# Patient Record
Sex: MALE | Race: WHITE | NOT HISPANIC OR LATINO | URBAN - METROPOLITAN AREA
[De-identification: names, ages, dates, MRNs, and addresses within clinical notes are randomized per-mention and may not be internally consistent; named-entity substitution may affect disease eponyms.]

---

## 2022-11-09 ENCOUNTER — OFFICE VISIT (OUTPATIENT)
Dept: URBAN - METROPOLITAN AREA CLINIC 63 | Facility: CLINIC | Age: 62
End: 2022-11-09
Payer: MEDICARE

## 2022-11-09 ENCOUNTER — WEB ENCOUNTER (OUTPATIENT)
Dept: URBAN - METROPOLITAN AREA CLINIC 63 | Facility: CLINIC | Age: 62
End: 2022-11-09

## 2022-11-09 VITALS
BODY MASS INDEX: 28.41 KG/M2 | DIASTOLIC BLOOD PRESSURE: 72 MMHG | TEMPERATURE: 97.5 F | WEIGHT: 191.8 LBS | OXYGEN SATURATION: 99 % | SYSTOLIC BLOOD PRESSURE: 106 MMHG | HEART RATE: 69 BPM | HEIGHT: 69 IN

## 2022-11-09 DIAGNOSIS — R63.4 ABNORMAL WEIGHT LOSS: ICD-10-CM

## 2022-11-09 DIAGNOSIS — R11.0 NAUSEA: ICD-10-CM

## 2022-11-09 DIAGNOSIS — K59.00 CONSTIPATION, UNSPECIFIED CONSTIPATION TYPE: ICD-10-CM

## 2022-11-09 DIAGNOSIS — K21.9 GASTROESOPHAGEAL REFLUX DISEASE WITHOUT ESOPHAGITIS: ICD-10-CM

## 2022-11-09 PROCEDURE — 99204 OFFICE O/P NEW MOD 45 MIN: CPT | Performed by: PHYSICIAN ASSISTANT

## 2022-11-09 RX ORDER — TAMSULOSIN HYDROCHLORIDE 0.4 MG/1
1 CAPSULE CAPSULE ORAL ONCE A DAY
Status: ACTIVE | COMMUNITY

## 2022-11-09 RX ORDER — ATORVASTATIN CALCIUM 40 MG/1
1 TABLET TABLET, FILM COATED ORAL ONCE A DAY
Status: ACTIVE | COMMUNITY

## 2022-11-09 RX ORDER — MULTIVIT WITH MINERALS/LUTEIN
AS DIRECTED TABLET ORAL
Status: ACTIVE | COMMUNITY

## 2022-11-09 RX ORDER — LISINOPRIL 20 MG/1
1 TABLET TABLET ORAL ONCE A DAY
Status: ACTIVE | COMMUNITY

## 2022-11-09 RX ORDER — SOLIFENACIN SUCCINATE 10 MG/1
1 TABLET TABLET, FILM COATED ORAL ONCE A DAY
Status: ACTIVE | COMMUNITY

## 2022-11-09 RX ORDER — ESOMEPRAZOLE MAGNESIUM 20 MG/1
1 CAPSULE 30 MINS BEFORE MEALS CAPSULE, DELAYED RELEASE ORAL TWICE A DAY
Refills: 2 | OUTPATIENT
Start: 2022-11-09

## 2022-11-09 RX ORDER — MOMETASONE FUROATE 50 UG/1
4 SPRAYS (2 SPRAYS IN EACH NOSTRIL) SPRAY, METERED NASAL ONCE A DAY
Status: ACTIVE | COMMUNITY

## 2022-11-09 RX ORDER — ACETAMINOPHEN 650 MG
2 TABLETS AS NEEDED TABLET, EXTENDED RELEASE ORAL
Status: ACTIVE | COMMUNITY

## 2022-11-09 RX ORDER — NAPROXEN 500 MG/1
1 TABLET WITH FOOD OR MILK AS NEEDED TABLET ORAL TWICE A DAY
Status: ACTIVE | COMMUNITY

## 2022-11-09 RX ORDER — ESOMEPRAZOLE MAGNESIUM 20 MG/1
1 CAPSULE CAPSULE, DELAYED RELEASE ORAL ONCE A DAY
Status: ACTIVE | COMMUNITY

## 2022-11-09 RX ORDER — KRILL/OM-3/DHA/EPA/PHOSPHO/AST 1000-230MG
1 TABLET CAPSULE ORAL ONCE A DAY
Status: ACTIVE | COMMUNITY

## 2022-11-09 NOTE — PHYSICAL EXAM GASTROINTESTINAL
Abdomen , soft, nontender, nondistended , no guarding or rigidity , no masses palpable , normal bowel sounds , Liver and Spleen,  no hepatosplenomegaly , liver nontender unknown

## 2022-11-09 NOTE — HPI-TODAY'S VISIT:
Joel Gentile) is a pleasant 62-year-old male who presents today for evaluation of abnormal weight loss  KUB dated 11/7/2022 showed left internal double-J ureteral stent which appears appropriately positioned.  Bilateral urinary tract calculi  He states he had COVID a year ago and lost his sense of smell and taste. History of CVA in 7/2022. Lost 39 pounds since July 2022 and 29 of those pounds over the last 6 weeks. Mentions when he eats food it tastes terrible and when he eats or drinks something it smells "rancid". Frequent nausea with prandial events. Patient's last colonoscopy was approx 1 year ago. No record available for review. Per patient it was normal and placed into a 10 year recall. Chronic reflux well controlled on Nexium 20 mg qd. Chronic PPI use over many years.  Notes a bowel movement every 4-5 days. Denies vomiting, dysphagia, odynophagia, hematemesis, coffee ground emesis, abdominal pain, BRBPR or melena. Denies family history of colon cancer or colon polyps. No other GI complaints at this time

## 2022-11-12 ENCOUNTER — LAB OUTSIDE AN ENCOUNTER (OUTPATIENT)
Dept: URBAN - METROPOLITAN AREA CLINIC 63 | Facility: CLINIC | Age: 62
End: 2022-11-12

## 2022-11-12 ENCOUNTER — TELEPHONE ENCOUNTER (OUTPATIENT)
Dept: URBAN - METROPOLITAN AREA CLINIC 63 | Facility: CLINIC | Age: 62
End: 2022-11-12

## 2022-11-12 LAB
A/G RATIO: 2
AFP, SERUM, TUMOR MARKER: 1.8
ALBUMIN: 4.3
ALKALINE PHOSPHATASE: 68
ALT (SGPT): 23
AST (SGOT): 19
BILIRUBIN, TOTAL: 1.1
BUN/CREATININE RATIO: (no result)
BUN: 22
C-REACTIVE PROTEIN, QUANT: 0.8
CA 19-9: 6
CALCIUM: 9.5
CARBON DIOXIDE, TOTAL: 26
CEA: 0.8
CHLORIDE: 107
CREATININE: 1.22
EGFR: 66
GLOBULIN, TOTAL: 2.2
GLUCOSE: 81
HEMATOCRIT: 40.7
HEMOGLOBIN: 13.8
HEPATITIS C ANTIBODY: (no result)
MCH: 31.1
MCHC: 33.9
MCV: 91.7
MPV: 9.3
PLATELET COUNT: 177
POTASSIUM: 4.9
PROTEIN, TOTAL: 6.5
RDW: 13.2
RED BLOOD CELL COUNT: 4.44
SIGNAL TO CUT-OFF: 0.02
SODIUM: 142
TSH: 2.49
WHITE BLOOD CELL COUNT: 5.8

## 2022-11-16 ENCOUNTER — LAB OUTSIDE AN ENCOUNTER (OUTPATIENT)
Dept: URBAN - METROPOLITAN AREA CLINIC 63 | Facility: CLINIC | Age: 62
End: 2022-11-16

## 2022-11-18 ENCOUNTER — OFFICE VISIT (OUTPATIENT)
Dept: URBAN - METROPOLITAN AREA SURGERY CENTER 4 | Facility: SURGERY CENTER | Age: 62
End: 2022-11-18
Payer: MEDICARE

## 2022-11-18 ENCOUNTER — CLAIMS CREATED FROM THE CLAIM WINDOW (OUTPATIENT)
Dept: URBAN - METROPOLITAN AREA CLINIC 4 | Facility: CLINIC | Age: 62
End: 2022-11-18
Payer: MEDICARE

## 2022-11-18 DIAGNOSIS — R11.0 NAUSEA: ICD-10-CM

## 2022-11-18 DIAGNOSIS — K31.89 OTHER DISEASES OF STOMACH AND DUODENUM: ICD-10-CM

## 2022-11-18 DIAGNOSIS — K29.70 GASTRITIS: ICD-10-CM

## 2022-11-18 DIAGNOSIS — R63.4 ABNORMAL WEIGHT LOSS: ICD-10-CM

## 2022-11-18 DIAGNOSIS — K29.70 GASTRITIS, UNSPECIFIED, WITHOUT BLEEDING: ICD-10-CM

## 2022-11-18 PROCEDURE — 43239 EGD BIOPSY SINGLE/MULTIPLE: CPT | Performed by: INTERNAL MEDICINE

## 2022-11-18 PROCEDURE — 88312 SPECIAL STAINS GROUP 1: CPT | Performed by: PATHOLOGY

## 2022-11-18 PROCEDURE — 88305 TISSUE EXAM BY PATHOLOGIST: CPT | Performed by: PATHOLOGY

## 2022-11-18 RX ORDER — MULTIVIT WITH MINERALS/LUTEIN
AS DIRECTED TABLET ORAL
Status: ACTIVE | COMMUNITY

## 2022-11-18 RX ORDER — LISINOPRIL 20 MG/1
1 TABLET TABLET ORAL ONCE A DAY
Status: ACTIVE | COMMUNITY

## 2022-11-18 RX ORDER — MOMETASONE FUROATE 50 UG/1
4 SPRAYS (2 SPRAYS IN EACH NOSTRIL) SPRAY, METERED NASAL ONCE A DAY
Status: ACTIVE | COMMUNITY

## 2022-11-18 RX ORDER — NAPROXEN 500 MG/1
1 TABLET WITH FOOD OR MILK AS NEEDED TABLET ORAL TWICE A DAY
Status: ACTIVE | COMMUNITY

## 2022-11-18 RX ORDER — TAMSULOSIN HYDROCHLORIDE 0.4 MG/1
1 CAPSULE CAPSULE ORAL ONCE A DAY
Status: ACTIVE | COMMUNITY

## 2022-11-18 RX ORDER — SOLIFENACIN SUCCINATE 10 MG/1
1 TABLET TABLET, FILM COATED ORAL ONCE A DAY
Status: ACTIVE | COMMUNITY

## 2022-11-18 RX ORDER — ESOMEPRAZOLE MAGNESIUM 20 MG/1
1 CAPSULE CAPSULE, DELAYED RELEASE ORAL ONCE A DAY
Status: ACTIVE | COMMUNITY

## 2022-11-18 RX ORDER — ATORVASTATIN CALCIUM 40 MG/1
1 TABLET TABLET, FILM COATED ORAL ONCE A DAY
Status: ACTIVE | COMMUNITY

## 2022-11-18 RX ORDER — KRILL/OM-3/DHA/EPA/PHOSPHO/AST 1000-230MG
1 TABLET CAPSULE ORAL ONCE A DAY
Status: ACTIVE | COMMUNITY

## 2022-11-18 RX ORDER — ACETAMINOPHEN 650 MG
2 TABLETS AS NEEDED TABLET, EXTENDED RELEASE ORAL
Status: ACTIVE | COMMUNITY

## 2022-11-18 RX ORDER — ESOMEPRAZOLE MAGNESIUM 20 MG/1
1 CAPSULE 30 MINS BEFORE MEALS CAPSULE, DELAYED RELEASE ORAL TWICE A DAY
Refills: 2 | Status: ACTIVE | COMMUNITY
Start: 2022-11-09

## 2022-12-07 ENCOUNTER — LAB OUTSIDE AN ENCOUNTER (OUTPATIENT)
Dept: URBAN - METROPOLITAN AREA CLINIC 63 | Facility: CLINIC | Age: 62
End: 2022-12-07

## 2022-12-07 ENCOUNTER — OFFICE VISIT (OUTPATIENT)
Dept: URBAN - METROPOLITAN AREA CLINIC 63 | Facility: CLINIC | Age: 62
End: 2022-12-07
Payer: MEDICARE

## 2022-12-07 VITALS
TEMPERATURE: 97.6 F | OXYGEN SATURATION: 98 % | HEART RATE: 68 BPM | HEIGHT: 69 IN | DIASTOLIC BLOOD PRESSURE: 72 MMHG | BODY MASS INDEX: 25.98 KG/M2 | WEIGHT: 175.4 LBS | SYSTOLIC BLOOD PRESSURE: 114 MMHG

## 2022-12-07 DIAGNOSIS — R11.0 NAUSEA: ICD-10-CM

## 2022-12-07 DIAGNOSIS — R63.4 ABNORMAL WEIGHT LOSS: ICD-10-CM

## 2022-12-07 DIAGNOSIS — R43.9 TASTE DISORDER: ICD-10-CM

## 2022-12-07 DIAGNOSIS — R93.89 ABNORMAL CT OF THE CHEST: ICD-10-CM

## 2022-12-07 DIAGNOSIS — K59.00 CONSTIPATION, UNSPECIFIED CONSTIPATION TYPE: ICD-10-CM

## 2022-12-07 DIAGNOSIS — K21.9 GASTROESOPHAGEAL REFLUX DISEASE WITHOUT ESOPHAGITIS: ICD-10-CM

## 2022-12-07 DIAGNOSIS — K86.89 PANCREATIC ATROPHY: ICD-10-CM

## 2022-12-07 PROBLEM — 14760008: Status: ACTIVE | Noted: 2022-11-09

## 2022-12-07 PROBLEM — 266435005: Status: ACTIVE | Noted: 2022-11-09

## 2022-12-07 PROCEDURE — 99214 OFFICE O/P EST MOD 30 MIN: CPT | Performed by: PHYSICIAN ASSISTANT

## 2022-12-07 RX ORDER — ACETAMINOPHEN 650 MG
2 TABLETS AS NEEDED TABLET, EXTENDED RELEASE ORAL
Status: ACTIVE | COMMUNITY

## 2022-12-07 RX ORDER — MULTIVIT WITH MINERALS/LUTEIN
AS DIRECTED TABLET ORAL
Status: ON HOLD | COMMUNITY

## 2022-12-07 RX ORDER — LISINOPRIL 20 MG/1
1 TABLET TABLET ORAL ONCE A DAY
Status: ON HOLD | COMMUNITY

## 2022-12-07 RX ORDER — ESOMEPRAZOLE MAGNESIUM 20 MG/1
1 CAPSULE 30 MINS BEFORE MEALS CAPSULE, DELAYED RELEASE ORAL TWICE A DAY
Refills: 2 | Status: ON HOLD | COMMUNITY
Start: 2022-11-09

## 2022-12-07 RX ORDER — TAMSULOSIN HYDROCHLORIDE 0.4 MG/1
1 CAPSULE CAPSULE ORAL ONCE A DAY
Status: ON HOLD | COMMUNITY

## 2022-12-07 RX ORDER — NAPROXEN 500 MG/1
1 TABLET WITH FOOD OR MILK AS NEEDED TABLET ORAL TWICE A DAY
Status: ACTIVE | COMMUNITY

## 2022-12-07 RX ORDER — VIBEGRON 75 MG/1
1 TABLET TABLET, FILM COATED ORAL ONCE A DAY
Status: ACTIVE | COMMUNITY

## 2022-12-07 RX ORDER — ATORVASTATIN CALCIUM 40 MG/1
1 TABLET TABLET, FILM COATED ORAL ONCE A DAY
Status: ACTIVE | COMMUNITY

## 2022-12-07 RX ORDER — ESOMEPRAZOLE MAGNESIUM 20 MG/1
1 CAPSULE CAPSULE, DELAYED RELEASE ORAL ONCE A DAY
Status: ACTIVE | COMMUNITY

## 2022-12-07 RX ORDER — KRILL/OM-3/DHA/EPA/PHOSPHO/AST 1000-230MG
1 TABLET CAPSULE ORAL ONCE A DAY
Status: ACTIVE | COMMUNITY

## 2022-12-07 RX ORDER — SOLIFENACIN SUCCINATE 10 MG/1
1 TABLET TABLET, FILM COATED ORAL ONCE A DAY
Status: ON HOLD | COMMUNITY

## 2022-12-07 RX ORDER — MOMETASONE FUROATE 50 UG/1
4 SPRAYS (2 SPRAYS IN EACH NOSTRIL) SPRAY, METERED NASAL ONCE A DAY
Status: ON HOLD | COMMUNITY

## 2022-12-07 NOTE — HPI-TODAY'S VISIT:
Joel Gentile) is a pleasant 62-year-old male who presents today for a procedure follow up. Seen last visit in evaluation of abnormal weight loss  Colonoscopy performed in March 2022 demonstrated moderate sigmoid diverticulosis.  Repeat colonoscopy in 10 years  EGD dated 11/18/2022 showed a regular Z-line.  Gastritis.  Normal duodenum.  Multiple gastric polyps in the gastric body biopsied.  Labs dated 11/9/2022 demonstrated normal CEA.  AFP normal.  CA 19-9 normal.  CRP normal.  CMP normal.  CBC normal.  TSH normal.  HCV antibody negative.  Labs dated 11/11/2022 showed a normal hemoglobin.  Normal platelets.  PT/INR normal.  TSH normal.  GFR normal.  Cr normal. Total bilirubin 1.4 but otherwise normal liver enzymes  CTA chest dated 11/11/2022 showed no findings of PE.  Focal areas of mucous plugging within the posterior basilar segment of the right lower lobe.  Underlying endobronchial lesion cannot be entirely excluded.  Consider follow-up imaging in 1-3 months to assess for stability or interval change  CT chest with contrast dated 12/2/2022 showed no acute or suspicious finding in the chest.  Resolution of the focal mucous plugging in the right lower lobe reported on prior CTA 11/11/2022.  CT abdomen/pelvis with contrast dated 12/2/2022 showed no cause for weight loss identified.  Small bilateral nonobstructing renal calculi.  Left ureteral stent.  Small right renal cyst and few subcentimeter hypodensities in the left kidney that are too small to definitively characterize but cysts are possible.  Mild prostatomegaly.  Small amount of gas within the bladder is potentially iatrogenic.  Recommend correlation with recent medical/procedural history to help determine etiology.  No tethering to adjacent structures to suggest a fistula. Patient with Cystourethroscopy 2 days prior.  No issues after procedure. Pathology not back yet from his endoscopy. Last visit patient mentioned he had COVID about a year ago and since then lost his smell and taste.  History of CVA in July 2022 and since then lost about 55 pounds. 16 pound weight loss since last visit. He noted when he eats having a terrible taste in his mouth and rancid smell.  Noted frequent nausea with prandial events.  Noted issues with constipation having a bowel movement every 4 to 5 days. Chronic reflux well controlled on Nexium 20 mg BID.  At today's visit notes worsening nausea and vomiting. Worse when eats. Bowel movements now regular. Denies dysphagia, odynophagia, hematemesis, coffee ground emesis, abdominal pain, BRBPR or melena. Denies family history of colon cancer or colon polyps. No other GI complaints at this time

## 2022-12-09 ENCOUNTER — TELEPHONE ENCOUNTER (OUTPATIENT)
Dept: URBAN - METROPOLITAN AREA CLINIC 63 | Facility: CLINIC | Age: 62
End: 2022-12-09

## 2022-12-12 ENCOUNTER — OFFICE VISIT (OUTPATIENT)
Dept: URBAN - METROPOLITAN AREA MEDICAL CENTER 3 | Facility: MEDICAL CENTER | Age: 62
End: 2022-12-12
Payer: MEDICARE

## 2022-12-12 DIAGNOSIS — R63.4 ABNORMAL WEIGHT LOSS: ICD-10-CM

## 2022-12-12 DIAGNOSIS — K29.70 GASTRITIS: ICD-10-CM

## 2022-12-12 DIAGNOSIS — K31.7 GASTRIC POLYPS: ICD-10-CM

## 2022-12-12 DIAGNOSIS — K86.89 PANCREATIC ATROPHY: ICD-10-CM

## 2022-12-12 PROCEDURE — 43259 EGD US EXAM DUODENUM/JEJUNUM: CPT | Performed by: INTERNAL MEDICINE

## 2022-12-12 RX ORDER — NAPROXEN 500 MG/1
1 TABLET WITH FOOD OR MILK AS NEEDED TABLET ORAL TWICE A DAY
Status: ACTIVE | COMMUNITY

## 2022-12-12 RX ORDER — MULTIVIT WITH MINERALS/LUTEIN
AS DIRECTED TABLET ORAL
Status: ON HOLD | COMMUNITY

## 2022-12-12 RX ORDER — ATORVASTATIN CALCIUM 40 MG/1
1 TABLET TABLET, FILM COATED ORAL ONCE A DAY
Status: ACTIVE | COMMUNITY

## 2022-12-12 RX ORDER — ACETAMINOPHEN 650 MG
2 TABLETS AS NEEDED TABLET, EXTENDED RELEASE ORAL
Status: ACTIVE | COMMUNITY

## 2022-12-12 RX ORDER — SOLIFENACIN SUCCINATE 10 MG/1
1 TABLET TABLET, FILM COATED ORAL ONCE A DAY
Status: ON HOLD | COMMUNITY

## 2022-12-12 RX ORDER — VIBEGRON 75 MG/1
1 TABLET TABLET, FILM COATED ORAL ONCE A DAY
Status: ACTIVE | COMMUNITY

## 2022-12-12 RX ORDER — LISINOPRIL 20 MG/1
1 TABLET TABLET ORAL ONCE A DAY
Status: ON HOLD | COMMUNITY

## 2022-12-12 RX ORDER — ESOMEPRAZOLE MAGNESIUM 20 MG/1
1 CAPSULE 30 MINS BEFORE MEALS CAPSULE, DELAYED RELEASE ORAL TWICE A DAY
Refills: 2 | Status: ON HOLD | COMMUNITY
Start: 2022-11-09

## 2022-12-12 RX ORDER — ESOMEPRAZOLE MAGNESIUM 20 MG/1
1 CAPSULE CAPSULE, DELAYED RELEASE ORAL ONCE A DAY
Status: ACTIVE | COMMUNITY

## 2022-12-12 RX ORDER — MOMETASONE FUROATE 50 UG/1
4 SPRAYS (2 SPRAYS IN EACH NOSTRIL) SPRAY, METERED NASAL ONCE A DAY
Status: ON HOLD | COMMUNITY

## 2022-12-12 RX ORDER — TAMSULOSIN HYDROCHLORIDE 0.4 MG/1
1 CAPSULE CAPSULE ORAL ONCE A DAY
Status: ON HOLD | COMMUNITY

## 2022-12-12 RX ORDER — KRILL/OM-3/DHA/EPA/PHOSPHO/AST 1000-230MG
1 TABLET CAPSULE ORAL ONCE A DAY
Status: ACTIVE | COMMUNITY

## 2022-12-20 ENCOUNTER — TELEPHONE ENCOUNTER (OUTPATIENT)
Dept: URBAN - METROPOLITAN AREA CLINIC 63 | Facility: CLINIC | Age: 62
End: 2022-12-20

## 2022-12-30 ENCOUNTER — OFFICE VISIT (OUTPATIENT)
Dept: URBAN - METROPOLITAN AREA CLINIC 63 | Facility: CLINIC | Age: 62
End: 2022-12-30

## 2022-12-30 ENCOUNTER — TELEPHONE ENCOUNTER (OUTPATIENT)
Dept: URBAN - METROPOLITAN AREA CLINIC 63 | Facility: CLINIC | Age: 62
End: 2022-12-30

## 2022-12-30 PROBLEM — 4556007 GASTRITIS: Status: ACTIVE | Noted: 2022-12-10

## 2022-12-30 PROBLEM — 92411005 BENIGN NEOPLASM OF STOMACH: Status: ACTIVE | Noted: 2022-12-30

## 2023-01-12 ENCOUNTER — OFFICE VISIT (OUTPATIENT)
Dept: URBAN - METROPOLITAN AREA CLINIC 63 | Facility: CLINIC | Age: 63
End: 2023-01-12
Payer: MEDICARE

## 2023-01-12 ENCOUNTER — LAB OUTSIDE AN ENCOUNTER (OUTPATIENT)
Dept: URBAN - METROPOLITAN AREA CLINIC 63 | Facility: CLINIC | Age: 63
End: 2023-01-12

## 2023-01-12 VITALS
SYSTOLIC BLOOD PRESSURE: 124 MMHG | DIASTOLIC BLOOD PRESSURE: 78 MMHG | BODY MASS INDEX: 26.33 KG/M2 | WEIGHT: 177.8 LBS | HEIGHT: 69 IN | TEMPERATURE: 97.9 F | OXYGEN SATURATION: 99 % | HEART RATE: 75 BPM

## 2023-01-12 DIAGNOSIS — R93.89 ABNORMAL CT OF THE CHEST: ICD-10-CM

## 2023-01-12 DIAGNOSIS — R43.9 TASTE DISORDER: ICD-10-CM

## 2023-01-12 DIAGNOSIS — R63.4 ABNORMAL WEIGHT LOSS: ICD-10-CM

## 2023-01-12 PROBLEM — 16900311000119102: Status: ACTIVE | Noted: 2022-12-07

## 2023-01-12 PROBLEM — 399993004: Status: ACTIVE | Noted: 2022-12-07

## 2023-01-12 PROCEDURE — 99213 OFFICE O/P EST LOW 20 MIN: CPT | Performed by: INTERNAL MEDICINE

## 2023-01-12 RX ORDER — ATORVASTATIN CALCIUM 40 MG/1
1 TABLET TABLET, FILM COATED ORAL ONCE A DAY
Status: ACTIVE | COMMUNITY

## 2023-01-12 RX ORDER — ACETAMINOPHEN 650 MG
2 TABLETS AS NEEDED TABLET, EXTENDED RELEASE ORAL
Status: ACTIVE | COMMUNITY

## 2023-01-12 RX ORDER — ESOMEPRAZOLE MAGNESIUM 20 MG/1
1 CAPSULE CAPSULE, DELAYED RELEASE ORAL ONCE A DAY
Status: ACTIVE | COMMUNITY

## 2023-01-12 RX ORDER — NAPROXEN 500 MG/1
1 TABLET WITH FOOD OR MILK AS NEEDED TABLET ORAL TWICE A DAY
Status: ACTIVE | COMMUNITY

## 2023-01-12 NOTE — HPI-TODAY'S VISIT:
Joel Gentile) is a pleasant 62-year-old male who presents today for a procedure follow up. Right upper quadrant ultrasound ordered last visit not completed.  Consideration of PET scan pending clinical course.  Recommended he follow-up with urology, neurology and pulmonology as needed including repeat imaging as needed.  Patient was referred to another pulmonologist for second opinion.  Recommended he continue 20 mg of Nexium twice daily.  Referred to ENT for evaluation of dysguesia.  Approximately 55 pound weight loss since his CVA in July 2022. Last visit noted worsening nausea and vomiting.  GES dated 1/3/23 showed rapid emptying  Underwent EUS/EGD on 12/12/2022 showed no pancreatic mass lesion seen.  Pancreatic atrophy.  Gastric polyps.  Gastritis.  Duodenal diverticulum in D2.  Colonoscopy performed in March 2022 demonstrated moderate sigmoid diverticulosis.  Repeat colonoscopy in 10 years  EGD dated 11/18/2022 showed a regular Z-line.  Gastritis.  Normal duodenum.  Multiple gastric polyps in the gastric body biopsied. No significant duodenal abnormality.  Chemical/reactive gastropathy negative for H. pylori or intestinal metaplasia.  Body polyp consistent with a hyperplastic polyp.  GE junction mucosa benign.  Labs dated 11/9/2022 demonstrated normal CEA.  AFP normal.  CA 19-9 normal.  CRP normal.  CMP normal.  CBC normal.  TSH normal.  HCV antibody negative.  Labs dated 11/11/2022 showed a normal hemoglobin.  Normal platelets.  PT/INR normal.  TSH normal.  GFR normal.  Cr normal. Total bilirubin 1.4 but otherwise normal liver enzymes  CTA chest dated 11/11/2022 showed no findings of PE.  Focal areas of mucous plugging within the posterior basilar segment of the right lower lobe.  Underlying endobronchial lesion cannot be entirely excluded.  Consider follow-up imaging in 1-3 months to assess for stability or interval change  CT chest with contrast dated 12/2/2022 showed no acute or suspicious finding in the chest.  Resolution of the focal mucous plugging in the right lower lobe reported on prior CTA 11/11/2022.  CT abdomen/pelvis with contrast dated 12/2/2022 showed no cause for weight loss identified.  Small bilateral nonobstructing renal calculi.  Left ureteral stent.  Small right renal cyst and few subcentimeter hypodensities in the left kidney that are too small to definitively characterize but cysts are possible.  Mild prostatomegaly.  Small amount of gas within the bladder is potentially iatrogenic.  Recommend correlation with recent medical/procedural history to help determine etiology.  No tethering to adjacent structures to suggest a fistula. Patient with Cystourethroscopy 2 days prior.    Patient still with  dysguesia, though mildly improved. Also with loss of smell. Hx of CVA in july 2022.   Denies hearburn,constipation ,rectal bleeding.Has gained 2 lbs since last visit. Lost 50lbs in past 6-7mo. Has not seen Pulmonology Feels nexium BID of little benefit. DEnies nausea, emesis Has seen DR GARCIA neurology recently no records available Overall states feels better more energy

## 2023-02-10 ENCOUNTER — TELEPHONE ENCOUNTER (OUTPATIENT)
Dept: URBAN - METROPOLITAN AREA CLINIC 63 | Facility: CLINIC | Age: 63
End: 2023-02-10

## 2023-03-17 ENCOUNTER — OFFICE VISIT (OUTPATIENT)
Dept: URBAN - METROPOLITAN AREA CLINIC 63 | Facility: CLINIC | Age: 63
End: 2023-03-17
Payer: MEDICARE

## 2023-03-17 VITALS
BODY MASS INDEX: 28.17 KG/M2 | OXYGEN SATURATION: 96 % | SYSTOLIC BLOOD PRESSURE: 134 MMHG | HEART RATE: 71 BPM | TEMPERATURE: 97.6 F | HEIGHT: 69 IN | DIASTOLIC BLOOD PRESSURE: 90 MMHG | WEIGHT: 190.2 LBS

## 2023-03-17 DIAGNOSIS — R63.4 ABNORMAL WEIGHT LOSS: ICD-10-CM

## 2023-03-17 DIAGNOSIS — R93.89 ABNORMAL CT OF THE CHEST: ICD-10-CM

## 2023-03-17 DIAGNOSIS — R43.9 TASTE DISORDER: ICD-10-CM

## 2023-03-17 DIAGNOSIS — K21.9 GASTROESOPHAGEAL REFLUX DISEASE WITHOUT ESOPHAGITIS: ICD-10-CM

## 2023-03-17 DIAGNOSIS — K86.89 PANCREATIC ATROPHY: ICD-10-CM

## 2023-03-17 PROCEDURE — 99213 OFFICE O/P EST LOW 20 MIN: CPT | Performed by: PHYSICIAN ASSISTANT

## 2023-03-17 RX ORDER — ACETAMINOPHEN 650 MG
2 TABLETS AS NEEDED TABLET, EXTENDED RELEASE ORAL
Status: ACTIVE | COMMUNITY

## 2023-03-17 RX ORDER — ESOMEPRAZOLE MAGNESIUM 20 MG/1
1 CAPSULE CAPSULE, DELAYED RELEASE ORAL ONCE A DAY
Status: ACTIVE | COMMUNITY

## 2023-03-17 RX ORDER — NAPROXEN 500 MG/1
1 TABLET WITH FOOD OR MILK AS NEEDED TABLET ORAL TWICE A DAY
Status: ACTIVE | COMMUNITY

## 2023-03-17 RX ORDER — ATORVASTATIN CALCIUM 40 MG/1
1 TABLET TABLET, FILM COATED ORAL ONCE A DAY
Status: ACTIVE | COMMUNITY

## 2023-03-17 NOTE — HPI-TODAY'S VISIT:
Joel Gentile) is a pleasant 63-year-old male who presents today for a follow up. PET scan ordered last visit not completed.  Recommended he follow-up with urology, ENT, neurology and pulmonology as needed including repeat imaging as needed.  Approximately 55 pound weight loss since his CVA in July 2022.  Patient was evaluated by Dr. Dangelo.  Weight loss felt to be result of the stroke and COVID-19 infection.  Currently on the uptrend in terms of weight.  In the absence of any pancreatic symptoms such as abdominal pain, weight loss, hyperglycemia or clinical evidence of pancreatic insufficiency no need to continue to evaluate the pancreas.  CA 19-9 and pancreatic elastase were checked and normal  Evaluated by neurology.  Improvement in smell and taste.  Evaluated by ENT for severe alteration of taste/smell.  Plan was to proceed with CT of the sinuses.  If normal can likely attribute symptoms to neurologic causes.  Moderately congested on nasal endoscopy.  Right upper quadrant ultrasound dated 12/10/2022 showed nonobstructing right renal calculus.  2 simple cyst at the upper pole of the right kidney  GES dated 1/3/23 showed rapid emptying  Underwent EUS/EGD on 12/12/2022 showed no pancreatic mass lesion seen.  Pancreatic atrophy.  Gastric polyps.  Gastritis.  Duodenal diverticulum in D2.  Colonoscopy performed in March 2022 demonstrated moderate sigmoid diverticulosis.  Repeat colonoscopy in 10 years  EGD dated 11/18/2022 showed a regular Z-line.  Gastritis.  Normal duodenum.  Multiple gastric polyps in the gastric body biopsied. No significant duodenal abnormality.  Chemical/reactive gastropathy negative for H. pylori or intestinal metaplasia.  Body polyp consistent with a hyperplastic polyp.  GE junction mucosa benign.  Labs dated 11/9/2022 demonstrated normal CEA.  AFP normal.  CA 19-9 normal.  CRP normal.  CMP normal.  CBC normal.  TSH normal.  HCV antibody negative.  Labs dated 11/11/2022 showed a normal hemoglobin.  Normal platelets.  PT/INR normal.  TSH normal.  GFR normal.  Cr normal. Total bilirubin 1.4 but otherwise normal liver enzymes  CTA chest dated 11/11/2022 showed no findings of PE.  Focal areas of mucous plugging within the posterior basilar segment of the right lower lobe.  Underlying endobronchial lesion cannot be entirely excluded.  Consider follow-up imaging in 1-3 months to assess for stability or interval change  CT chest with contrast dated 12/2/2022 showed no acute or suspicious finding in the chest.  Resolution of the focal mucous plugging in the right lower lobe reported on prior CTA 11/11/2022.  CT abdomen/pelvis with contrast dated 12/2/2022 showed no cause for weight loss identified.  Small bilateral nonobstructing renal calculi.  Left ureteral stent.  Small right renal cyst and few subcentimeter hypodensities in the left kidney that are too small to definitively characterize but cysts are possible.  Mild prostatomegaly.  Small amount of gas within the bladder is potentially iatrogenic.  Recommend correlation with recent medical/procedural history to help determine etiology.  No tethering to adjacent structures to suggest a fistula. Patient with Cystourethroscopy 2 days prior.    Started gaining weight back. Significantly improved overall. Appetite is back.  Chronic reflux well controlled on Nexium 20 mg qd. Noting regular bowel movements. Denies nausea, vomiting, dysphagia, odynophagia, hematemesis, coffee ground emesis, abdominal pain, change in bowel habits, BRBPR or melena. Denies family history of colon cancer or colon polyps. No other GI complaints at this time

## 2023-04-19 ENCOUNTER — TELEPHONE ENCOUNTER (OUTPATIENT)
Dept: URBAN - METROPOLITAN AREA SURGERY CENTER 4 | Facility: SURGERY CENTER | Age: 63
End: 2023-04-19

## 2023-04-25 ENCOUNTER — TELEPHONE ENCOUNTER (OUTPATIENT)
Dept: URBAN - METROPOLITAN AREA CLINIC 63 | Facility: CLINIC | Age: 63
End: 2023-04-25

## 2023-06-13 ENCOUNTER — P2P PATIENT RECORD (OUTPATIENT)
Age: 63
End: 2023-06-13

## 2023-06-15 ENCOUNTER — TELEPHONE ENCOUNTER (OUTPATIENT)
Dept: URBAN - METROPOLITAN AREA CLINIC 64 | Facility: CLINIC | Age: 63
End: 2023-06-15

## 2024-02-16 ENCOUNTER — OV EP (OUTPATIENT)
Dept: URBAN - METROPOLITAN AREA CLINIC 63 | Facility: CLINIC | Age: 64
End: 2024-02-16
Payer: MEDICARE

## 2024-02-16 ENCOUNTER — LAB (OUTPATIENT)
Dept: URBAN - METROPOLITAN AREA CLINIC 63 | Facility: CLINIC | Age: 64
End: 2024-02-16

## 2024-02-16 VITALS
WEIGHT: 195 LBS | TEMPERATURE: 97.8 F | OXYGEN SATURATION: 98 % | RESPIRATION RATE: 20 BRPM | DIASTOLIC BLOOD PRESSURE: 80 MMHG | BODY MASS INDEX: 28.88 KG/M2 | HEART RATE: 67 BPM | SYSTOLIC BLOOD PRESSURE: 138 MMHG | HEIGHT: 69 IN

## 2024-02-16 DIAGNOSIS — R93.2 ABNORMAL LIVER SCAN: ICD-10-CM

## 2024-02-16 DIAGNOSIS — K86.2 PANCREATIC CYST: ICD-10-CM

## 2024-02-16 DIAGNOSIS — K86.89 PANCREATIC ATROPHY: ICD-10-CM

## 2024-02-16 DIAGNOSIS — K21.9 GASTROESOPHAGEAL REFLUX DISEASE WITHOUT ESOPHAGITIS: ICD-10-CM

## 2024-02-16 PROCEDURE — 99214 OFFICE O/P EST MOD 30 MIN: CPT | Performed by: PHYSICIAN ASSISTANT

## 2024-02-16 RX ORDER — ATORVASTATIN CALCIUM 40 MG/1
1 TABLET TABLET, FILM COATED ORAL ONCE A DAY
Status: ACTIVE | COMMUNITY

## 2024-02-16 RX ORDER — NAPROXEN 500 MG/1
1 TABLET WITH FOOD OR MILK AS NEEDED TABLET ORAL TWICE A DAY
Status: ACTIVE | COMMUNITY

## 2024-02-16 RX ORDER — CLONAZEPAM 0.5 MG/1
1 TABLET TABLET ORAL ONCE A DAY
Status: ACTIVE | COMMUNITY

## 2024-02-16 RX ORDER — ESOMEPRAZOLE MAGNESIUM 20 MG/1
1 CAPSULE CAPSULE, DELAYED RELEASE ORAL ONCE A DAY
Status: ACTIVE | COMMUNITY

## 2024-02-16 RX ORDER — ACETAMINOPHEN 650 MG
2 TABLETS AS NEEDED TABLET, EXTENDED RELEASE ORAL
Status: ACTIVE | COMMUNITY

## 2024-02-16 RX ORDER — HYDROCHLOROTHIAZIDE 12.5 MG/1
1 CAPSULE IN THE MORNING CAPSULE, GELATIN COATED ORAL ONCE A DAY
Status: ACTIVE | COMMUNITY

## 2024-02-16 NOTE — HPI-TODAY'S VISIT:
Joel Gentile) is a pleasant 64-year-old male who presents today for a follow up.   Labs dated 1/6/24 showed normal CMP.  Normal CBC. Previous pancreatic elastase normal  MRI/MRCP with and without contrast dated 1/16/2024 showed pancreatic head/uncinate process cyst adjacent to the duodenal diverticulum probably unchanged since prior CT scan of December 2022.  6-month follow-up MRI and MRCP images recommended.  Hepatic steatosis and distorted hepatic architecture concerning for chronic liver disease, possibly cirrhosis.  A FibroScan could be considered to quantify the degree of steatosis and evaluate for fibrosis.  No definitive evidence of portal hypertension  Right upper quadrant ultrasound dated 12/10/2022 showed nonobstructing right renal calculus.  2 simple cyst at the upper pole of the right kidney  GES dated 1/3/23 showed rapid emptying  Underwent EUS/EGD on 12/12/2022 showed no pancreatic mass lesion seen.  Pancreatic atrophy.  Gastric polyps.  Gastritis.  Duodenal diverticulum in D2.  Colonoscopy performed in March 2022 demonstrated moderate sigmoid diverticulosis.  Repeat colonoscopy in 10 years  EGD dated 11/18/2022 showed a regular Z-line.  Gastritis.  Normal duodenum.  Multiple gastric polyps in the gastric body biopsied. No significant duodenal abnormality.  Chemical/reactive gastropathy negative for H. pylori or intestinal metaplasia.  Body polyp consistent with a hyperplastic polyp.  GE junction mucosa benign.  Labs dated 11/9/2022 demonstrated normal CEA.  AFP normal.  CA 19-9 normal.  CRP normal.  CMP normal.  CBC normal.  TSH normal.  HCV antibody negative.  Labs dated 11/11/2022 showed a normal hemoglobin.  Normal platelets.  PT/INR normal.  TSH normal.  GFR normal.  Cr normal. Total bilirubin 1.4 but otherwise normal liver enzymes  CTA chest dated 11/11/2022 showed no findings of PE.  Focal areas of mucous plugging within the posterior basilar segment of the right lower lobe.  Underlying endobronchial lesion cannot be entirely excluded.  Consider follow-up imaging in 1-3 months to assess for stability or interval change  CT chest with contrast dated 12/2/2022 showed no acute or suspicious finding in the chest.  Resolution of the focal mucous plugging in the right lower lobe reported on prior CTA 11/11/2022.  CT abdomen/pelvis with contrast dated 12/2/2022 showed no cause for weight loss identified.  Small bilateral nonobstructing renal calculi.  Left ureteral stent.  Small right renal cyst and few subcentimeter hypodensities in the left kidney that are too small to definitively characterize but cysts are possible.  Mild prostatomegaly.  Small amount of gas within the bladder is potentially iatrogenic.  Recommend correlation with recent medical/procedural history to help determine etiology.  No tethering to adjacent structures to suggest a fistula. Patient with Cystourethroscopy 2 days prior.    Noting regular bowel movements. Denies nausea, vomiting, heartburn, reflux, dysphagia, odynophagia, hematemesis, coffee ground emesis, abdominal pain, change in bowel habits, abnormal weight loss, BRBPR or melena. Denies family history of colon cancer or colon polyps. No other GI complaints at this time

## 2024-02-19 LAB
A/G RATIO: 1.7
AFP, SERUM, TUMOR MARKER: 1.7
ALBUMIN: 4.1
ALKALINE PHOSPHATASE: 73
ALT (SGPT): 22
AST (SGOT): 21
BILIRUBIN, TOTAL: 0.8
BUN/CREATININE RATIO: (no result)
BUN: 14
CALCIUM: 9.1
CARBON DIOXIDE, TOTAL: 25
CHLORIDE: 106
CREATININE: 1.18
EGFR: 68
GLOBULIN, TOTAL: 2.4
GLUCOSE: 91
HEMATOCRIT: 41.6
HEMOGLOBIN: 14.1
INR: 1
MCH: 29.3
MCHC: 33.9
MCV: 86.3
MPV: 9.7
PLATELET COUNT: 167
POTASSIUM: 5.1
PROTEIN, TOTAL: 6.5
PT: 10.6
RDW: 13.2
RED BLOOD CELL COUNT: 4.82
SODIUM: 141
WHITE BLOOD CELL COUNT: 5.9

## 2024-04-05 ENCOUNTER — OV EP (OUTPATIENT)
Dept: URBAN - METROPOLITAN AREA CLINIC 63 | Facility: CLINIC | Age: 64
End: 2024-04-05
Payer: MEDICARE

## 2024-04-05 ENCOUNTER — LAB (OUTPATIENT)
Dept: URBAN - METROPOLITAN AREA CLINIC 63 | Facility: CLINIC | Age: 64
End: 2024-04-05

## 2024-04-05 VITALS
OXYGEN SATURATION: 99 % | HEIGHT: 69 IN | SYSTOLIC BLOOD PRESSURE: 124 MMHG | HEART RATE: 68 BPM | BODY MASS INDEX: 29.95 KG/M2 | DIASTOLIC BLOOD PRESSURE: 80 MMHG | WEIGHT: 202.2 LBS | TEMPERATURE: 97.3 F

## 2024-04-05 DIAGNOSIS — K76.0 FATTY LIVER: ICD-10-CM

## 2024-04-05 DIAGNOSIS — K86.89 PANCREATIC ATROPHY: ICD-10-CM

## 2024-04-05 DIAGNOSIS — K86.2 PANCREATIC CYST: ICD-10-CM

## 2024-04-05 DIAGNOSIS — K21.9 GASTROESOPHAGEAL REFLUX DISEASE WITHOUT ESOPHAGITIS: ICD-10-CM

## 2024-04-05 PROBLEM — 197321007: Status: ACTIVE | Noted: 2024-04-05

## 2024-04-05 PROCEDURE — 99213 OFFICE O/P EST LOW 20 MIN: CPT | Performed by: PHYSICIAN ASSISTANT

## 2024-04-05 RX ORDER — ACETAMINOPHEN 650 MG
2 TABLETS AS NEEDED TABLET, EXTENDED RELEASE ORAL
Status: ACTIVE | COMMUNITY

## 2024-04-05 RX ORDER — TADALAFIL 5 MG/1
1 TABLET AS NEEDED TABLET, FILM COATED ORAL ONCE A DAY
Status: ACTIVE | COMMUNITY

## 2024-04-05 RX ORDER — HYDROCHLOROTHIAZIDE 12.5 MG/1
1 CAPSULE IN THE MORNING CAPSULE, GELATIN COATED ORAL ONCE A DAY
Status: ACTIVE | COMMUNITY

## 2024-04-05 RX ORDER — CLONAZEPAM 0.5 MG/1
1 TABLET TABLET ORAL ONCE A DAY
Status: ACTIVE | COMMUNITY

## 2024-04-05 RX ORDER — NAPROXEN 500 MG/1
1 TABLET WITH FOOD OR MILK AS NEEDED TABLET ORAL TWICE A DAY
Status: ACTIVE | COMMUNITY

## 2024-04-05 RX ORDER — ESOMEPRAZOLE MAGNESIUM 20 MG/1
1 CAPSULE CAPSULE, DELAYED RELEASE ORAL ONCE A DAY
Status: ACTIVE | COMMUNITY

## 2024-04-05 NOTE — HPI-TODAY'S VISIT:
Joel Gentile) is a pleasant 64-year-old male who presents today for a follow up.   MRI liver elastography dated 3/6/2024 showed hepatic steatosis without evidence of clinically significant fibrosis  Labs dated 2/19/2024 showed a normal AFP.  Normal CMP.  Normal CBC.  PT/INR normal  Labs dated 1/6/24 showed normal CMP.  Normal CBC. Previous pancreatic elastase normal  MRI/MRCP with and without contrast dated 1/16/2024 showed pancreatic head/uncinate process cyst adjacent to the duodenal diverticulum probably unchanged since prior CT scan of December 2022.  6-month follow-up MRI and MRCP images recommended.  Hepatic steatosis and distorted hepatic architecture concerning for chronic liver disease, possibly cirrhosis.  A FibroScan could be considered to quantify the degree of steatosis and evaluate for fibrosis.  No definitive evidence of portal hypertension  Right upper quadrant ultrasound dated 12/10/2022 showed nonobstructing right renal calculus.  2 simple cyst at the upper pole of the right kidney  GES dated 1/3/23 showed rapid emptying  Underwent EUS/EGD on 12/12/2022 showed no pancreatic mass lesion seen.  Pancreatic atrophy.  Gastric polyps.  Gastritis.  Duodenal diverticulum in D2.  Colonoscopy performed in March 2022 demonstrated moderate sigmoid diverticulosis.  Repeat colonoscopy in 10 years  EGD dated 11/18/2022 showed a regular Z-line.  Gastritis.  Normal duodenum.  Multiple gastric polyps in the gastric body biopsied. No significant duodenal abnormality.  Chemical/reactive gastropathy negative for H. pylori or intestinal metaplasia.  Body polyp consistent with a hyperplastic polyp.  GE junction mucosa benign.  Labs dated 11/9/2022 demonstrated normal CEA.  AFP normal.  CA 19-9 normal.  CRP normal.  CMP normal.  CBC normal.  TSH normal.  HCV antibody negative.  CTA chest dated 11/11/2022 showed no findings of PE.  Focal areas of mucous plugging within the posterior basilar segment of the right lower lobe.  Underlying endobronchial lesion cannot be entirely excluded.  Consider follow-up imaging in 1-3 months to assess for stability or interval change  CT chest with contrast dated 12/2/2022 showed no acute or suspicious finding in the chest.  Resolution of the focal mucous plugging in the right lower lobe reported on prior CTA 11/11/2022.  CT abdomen/pelvis with contrast dated 12/2/2022 showed no cause for weight loss identified.  Small bilateral nonobstructing renal calculi.  Left ureteral stent.  Small right renal cyst and few subcentimeter hypodensities in the left kidney that are too small to definitively characterize but cysts are possible.  Mild prostatomegaly.  Small amount of gas within the bladder is potentially iatrogenic.  Recommend correlation with recent medical/procedural history to help determine etiology.  No tethering to adjacent structures to suggest a fistula. Patient with Cystourethroscopy 2 days prior.    Noting regular bowel movements. Denies nausea, vomiting, heartburn, reflux, dysphagia, odynophagia, hematemesis, coffee ground emesis, abdominal pain, change in bowel habits, abnormal weight loss, BRBPR or melena. Denies family history of colon cancer or colon polyps. No other GI complaints at this time

## 2024-06-20 ENCOUNTER — P2P PATIENT RECORD (OUTPATIENT)
Age: 64
End: 2024-06-20

## 2024-06-24 ENCOUNTER — TELEPHONE ENCOUNTER (OUTPATIENT)
Dept: URBAN - METROPOLITAN AREA CLINIC 63 | Facility: CLINIC | Age: 64
End: 2024-06-24

## 2024-07-08 ENCOUNTER — LAB OUTSIDE AN ENCOUNTER (OUTPATIENT)
Dept: URBAN - METROPOLITAN AREA CLINIC 63 | Facility: CLINIC | Age: 64
End: 2024-07-08

## 2024-08-30 ENCOUNTER — DASHBOARD ENCOUNTERS (OUTPATIENT)
Age: 64
End: 2024-08-30

## 2024-08-30 ENCOUNTER — LAB OUTSIDE AN ENCOUNTER (OUTPATIENT)
Dept: URBAN - METROPOLITAN AREA CLINIC 63 | Facility: CLINIC | Age: 64
End: 2024-08-30

## 2024-08-30 ENCOUNTER — OFFICE VISIT (OUTPATIENT)
Dept: URBAN - METROPOLITAN AREA CLINIC 63 | Facility: CLINIC | Age: 64
End: 2024-08-30
Payer: MEDICARE

## 2024-08-30 VITALS
WEIGHT: 199 LBS | HEIGHT: 69 IN | BODY MASS INDEX: 29.47 KG/M2 | SYSTOLIC BLOOD PRESSURE: 128 MMHG | DIASTOLIC BLOOD PRESSURE: 82 MMHG | HEART RATE: 72 BPM | OXYGEN SATURATION: 98 % | TEMPERATURE: 98.2 F

## 2024-08-30 DIAGNOSIS — K86.2 PANCREATIC CYST: ICD-10-CM

## 2024-08-30 DIAGNOSIS — K21.9 GASTROESOPHAGEAL REFLUX DISEASE WITHOUT ESOPHAGITIS: ICD-10-CM

## 2024-08-30 DIAGNOSIS — K76.0 FATTY LIVER: ICD-10-CM

## 2024-08-30 PROCEDURE — 99214 OFFICE O/P EST MOD 30 MIN: CPT

## 2024-08-30 RX ORDER — TADALAFIL 5 MG/1
1 TABLET AS NEEDED TABLET, FILM COATED ORAL ONCE A DAY
Status: ACTIVE | COMMUNITY

## 2024-08-30 RX ORDER — CLONAZEPAM 0.5 MG/1
1 TABLET TABLET ORAL ONCE A DAY
Status: ACTIVE | COMMUNITY

## 2024-08-30 RX ORDER — HYDROCHLOROTHIAZIDE 12.5 MG/1
1 CAPSULE IN THE MORNING CAPSULE, GELATIN COATED ORAL ONCE A DAY
Status: ACTIVE | COMMUNITY

## 2024-08-30 RX ORDER — ACETAMINOPHEN 650 MG
2 TABLETS AS NEEDED TABLET, EXTENDED RELEASE ORAL
Status: ACTIVE | COMMUNITY

## 2024-08-30 RX ORDER — ESOMEPRAZOLE MAGNESIUM 20 MG/1
1 CAPSULE CAPSULE, DELAYED RELEASE ORAL ONCE A DAY
Status: ACTIVE | COMMUNITY

## 2024-08-30 RX ORDER — NAPROXEN 500 MG/1
1 TABLET WITH FOOD OR MILK AS NEEDED TABLET ORAL TWICE A DAY
Status: ACTIVE | COMMUNITY

## 2024-08-30 NOTE — HPI-PREVIOUS LABS
Labs dated 2/19/2024 showed a normal AFP. Normal CMP. Normal CBC. PT/INR normal  Labs dated 1/6/24 showed normal CMP. Normal CBC. Previous pancreatic elastase normal  Labs dated 11/9/2022 demonstrated normal CEA. AFP normal. CA 19-9 normal. CRP normal. CMP normal. CBC normal. TSH normal. HCV antibody negative.

## 2024-08-30 NOTE — HPI-PREVIOUS PROCEDURES
Colonoscopy performed in March 2022 demonstrated moderate sigmoid diverticulosis. Repeat colonoscopy in 10 years  EGD dated 11/18/2022 showed a regular Z-line. Gastritis. Normal duodenum. Multiple gastric polyps in the gastric body biopsied. No significant duodenal abnormality. Chemical/reactive gastropathy negative for H. pylori or intestinal metaplasia. Body polyp consistent with a hyperplastic polyp. GE junction mucosa benign.

## 2024-08-30 NOTE — HPI-PREVIOUS IMAGING
MRI abdomen pancreas protocol 7/8/2024 with hepatic steatosis, no focal liver abnormality, stable pancreatic cyst likely IPMN with follow-up MRI recommended in 6 months. No previous GI imaging to review.  MRI liver elastography dated 3/6/2024 showed hepatic steatosis without evidence of clinically significant fibrosis  MRI/MRCP with and without contrast dated 1/16/2024 showed pancreatic head/uncinate process cyst adjacent to the duodenal diverticulum probably unchanged since prior CT scan of December 2022. 6-month follow-up MRI and MRCP images recommended. Hepatic steatosis and distorted hepatic architecture concerning for chronic liver disease, possibly cirrhosis. A FibroScan could be considered to quantify the degree of steatosis and evaluate for fibrosis. No definitive evidence of portal hypertension  Right upper quadrant ultrasound dated 12/10/2022 showed nonobstructing right renal calculus. 2 simple cyst at the upper pole of the right kidney  GES dated 1/3/23 showed rapid emptying  Underwent EUS/EGD on 12/12/2022 showed no pancreatic mass lesion seen. Pancreatic atrophy. Gastric polyps. Gastritis. Duodenal diverticulum in D2.  CTA chest dated 11/11/2022 showed no findings of PE. Focal areas of mucous plugging within the posterior basilar segment of the right lower lobe. Underlying endobronchial lesion cannot be entirely excluded. Consider follow-up imaging in 1-3 months to assess for stability or interval change  CT chest with contrast dated 12/2/2022 showed no acute or suspicious finding in the chest. Resolution of the focal mucous plugging in the right lower lobe reported on prior CTA 11/11/2022. CT abdomen/pelvis with contrast dated 12/2/2022 showed no cause for weight loss identified. Small bilateral nonobstructing renal calculi. Left ureteral stent. Small right renal cyst and few subcentimeter hypodensities in the left kidney that are too small to definitively characterize but cysts are possible. Mild prostatomegaly. Small amount of gas within the bladder is potentially iatrogenic. Recommend correlation with recent medical/procedural history to help determine etiology. No tethering to adjacent structures to suggest a fistula. Patient with Cystourethroscopy 2 days prior.

## 2024-08-30 NOTE — HPI-TODAY'S VISIT:
Joel (Ej) is a pleasant 64-year-old male who presents today for a follow up.   Recent MRCP w/wo contract reviewed today with patient. We will repeat in 6 months. History of fatty liver. We will repeat Fibroscan 3/2025.  Noting regular bowel movements. Patient is feeling great. We review imaging and I answer all questions. Denies nausea, vomiting, heartburn, reflux, dysphagia, odynophagia, hematemesis, coffee ground emesis, abdominal pain, change in bowel habits, abnormal weight loss, BRBPR or melena. Denies family history of colon cancer or colon polyps. No other GI complaints at this time

## 2024-10-25 ENCOUNTER — TELEPHONE ENCOUNTER (OUTPATIENT)
Dept: URBAN - METROPOLITAN AREA CLINIC 63 | Facility: CLINIC | Age: 64
End: 2024-10-25

## 2024-10-25 ENCOUNTER — P2P PATIENT RECORD (OUTPATIENT)
Age: 64
End: 2024-10-25

## 2024-10-29 ENCOUNTER — OFFICE VISIT (OUTPATIENT)
Dept: URBAN - METROPOLITAN AREA CLINIC 60 | Facility: CLINIC | Age: 64
End: 2024-10-29
Payer: MEDICARE

## 2024-10-29 VITALS
BODY MASS INDEX: 28.32 KG/M2 | HEART RATE: 68 BPM | HEIGHT: 69 IN | WEIGHT: 191.2 LBS | TEMPERATURE: 97.6 F | OXYGEN SATURATION: 98 % | SYSTOLIC BLOOD PRESSURE: 126 MMHG | DIASTOLIC BLOOD PRESSURE: 80 MMHG

## 2024-10-29 DIAGNOSIS — R19.7 ACUTE DIARRHEA: ICD-10-CM

## 2024-10-29 PROCEDURE — 99213 OFFICE O/P EST LOW 20 MIN: CPT

## 2024-10-29 RX ORDER — ACETAMINOPHEN 650 MG
2 TABLETS AS NEEDED TABLET, EXTENDED RELEASE ORAL
Status: ACTIVE | COMMUNITY

## 2024-10-29 RX ORDER — HYDROCHLOROTHIAZIDE 12.5 MG/1
1 CAPSULE IN THE MORNING CAPSULE, GELATIN COATED ORAL ONCE A DAY
Status: ACTIVE | COMMUNITY

## 2024-10-29 RX ORDER — TAMSULOSIN HYDROCHLORIDE 0.4 MG/1
1 CAPSULE CAPSULE ORAL ONCE A DAY
Status: ACTIVE | COMMUNITY

## 2024-10-29 RX ORDER — TADALAFIL 5 MG/1
1 TABLET AS NEEDED TABLET, FILM COATED ORAL ONCE A DAY
Status: ACTIVE | COMMUNITY

## 2024-10-29 RX ORDER — CLONAZEPAM 0.5 MG/1
1 TABLET TABLET ORAL ONCE A DAY
Status: ACTIVE | COMMUNITY

## 2024-10-29 RX ORDER — EVOLOCUMAB 140 MG/ML
1 ML INJECTION, SOLUTION SUBCUTANEOUS
Status: ACTIVE | COMMUNITY

## 2024-10-29 RX ORDER — NAPROXEN 500 MG/1
1 TABLET WITH FOOD OR MILK AS NEEDED TABLET ORAL TWICE A DAY
Status: ACTIVE | COMMUNITY

## 2024-10-29 RX ORDER — CLOPIDOGREL 75 MG/1
1 TABLET TABLET, FILM COATED ORAL ONCE A DAY
Status: ACTIVE | COMMUNITY

## 2024-10-29 NOTE — HPI-TODAY'S VISIT:
Patient coming in for an updated appointment for a new issue.  He reports since 10/10/2024 he has had change in bowel habits with acute diarrhea symptoms.  He said it started suddenly on that day with up to 10 [place] type VII stools without pain or signs of GI bleeding.  Associated symptoms of urgency.  He additionally mentions he was found to have a TIA on ER visit 10/11/2024.  He still has some residual dizziness following the event.  He has been doing Imodium as needed but still having 3-4 [place] type VI-VII stools per day without pain or bleeding.  He is nervous to eat and has been on a bland diet but states he is lost around 5 pounds due to not eating as much.  He states since our last visit he was put on antibiotics for kidney stones as well as a dental procedure so he was unsure if that played in affecting his symptoms.  He tried taking a probiotic for 1 week but did not notice much of a difference.  Last OV 8/16/2024: Joel Gentile) is a pleasant 64-year-old male who presents today for a follow up.   Recent MRCP w/wo contract reviewed today with patient. We will repeat in 6 months. History of fatty liver. We will repeat Fibroscan 3/2025.  Noting regular bowel movements. Patient is feeling great. We review imaging and I answer all questions. Denies nausea, vomiting, heartburn, reflux, dysphagia, odynophagia, hematemesis, coffee ground emesis, abdominal pain, change in bowel habits, abnormal weight loss, BRBPR or melena. Denies family history of colon cancer or colon polyps. No other GI complaints at this time

## 2024-11-06 ENCOUNTER — WEB ENCOUNTER (OUTPATIENT)
Dept: URBAN - METROPOLITAN AREA CLINIC 63 | Facility: CLINIC | Age: 64
End: 2024-11-06

## 2024-11-06 ENCOUNTER — LAB OUTSIDE AN ENCOUNTER (OUTPATIENT)
Dept: URBAN - METROPOLITAN AREA CLINIC 63 | Facility: CLINIC | Age: 64
End: 2024-11-06

## 2024-11-08 ENCOUNTER — TELEPHONE ENCOUNTER (OUTPATIENT)
Dept: URBAN - METROPOLITAN AREA CLINIC 60 | Facility: CLINIC | Age: 64
End: 2024-11-08

## 2024-11-09 LAB
A/G RATIO: 2
ABSOLUTE BASOPHILS: 43
ABSOLUTE EOSINOPHILS: 170
ABSOLUTE LYMPHOCYTES: 1498
ABSOLUTE MONOCYTES: 575
ABSOLUTE NEUTROPHILS: 4814
ALBUMIN: 3.9
ALKALINE PHOSPHATASE: 59
ALT (SGPT): 19
AST (SGOT): 21
BASOPHILS: 0.6
BILIRUBIN, TOTAL: 0.5
BUN/CREATININE RATIO: (no result)
BUN: 18
CALCIUM: 8.9
CARBON DIOXIDE, TOTAL: 26
CHLORIDE: 108
CREATININE: 1.14
EGFR: 71
EOSINOPHILS: 2.4
GLOBULIN, TOTAL: 2
GLUCOSE: 97
HEMATOCRIT: 38
HEMOGLOBIN: 12.6
LYMPHOCYTES: 21.1
MCH: 29.6
MCHC: 33.2
MCV: 89.4
MONOCYTES: 8.1
MPV: 9.2
NEUTROPHILS: 67.8
PLATELET COUNT: 188
POTASSIUM: 4.1
PROTEIN, TOTAL: 5.9
RDW: 14.8
RED BLOOD CELL COUNT: 4.25
SODIUM: 141
WHITE BLOOD CELL COUNT: 7.1

## 2024-11-14 ENCOUNTER — WEB ENCOUNTER (OUTPATIENT)
Dept: URBAN - METROPOLITAN AREA CLINIC 63 | Facility: CLINIC | Age: 64
End: 2024-11-14

## 2024-12-02 ENCOUNTER — WEB ENCOUNTER (OUTPATIENT)
Dept: URBAN - METROPOLITAN AREA CLINIC 63 | Facility: CLINIC | Age: 64
End: 2024-12-02

## 2024-12-03 ENCOUNTER — WEB ENCOUNTER (OUTPATIENT)
Dept: URBAN - METROPOLITAN AREA CLINIC 63 | Facility: CLINIC | Age: 64
End: 2024-12-03

## 2024-12-19 ENCOUNTER — OFFICE VISIT (OUTPATIENT)
Dept: URBAN - METROPOLITAN AREA CLINIC 63 | Facility: CLINIC | Age: 64
End: 2024-12-19

## 2025-01-02 ENCOUNTER — WEB ENCOUNTER (OUTPATIENT)
Dept: URBAN - METROPOLITAN AREA CLINIC 63 | Facility: CLINIC | Age: 65
End: 2025-01-02

## 2025-01-17 ENCOUNTER — TELEPHONE ENCOUNTER (OUTPATIENT)
Dept: URBAN - METROPOLITAN AREA CLINIC 63 | Facility: CLINIC | Age: 65
End: 2025-01-17

## 2025-01-23 ENCOUNTER — TELEPHONE ENCOUNTER (OUTPATIENT)
Dept: URBAN - METROPOLITAN AREA CLINIC 63 | Facility: CLINIC | Age: 65
End: 2025-01-23

## 2025-01-24 ENCOUNTER — TELEPHONE ENCOUNTER (OUTPATIENT)
Dept: URBAN - METROPOLITAN AREA CLINIC 63 | Facility: CLINIC | Age: 65
End: 2025-01-24

## 2025-01-30 ENCOUNTER — TELEPHONE ENCOUNTER (OUTPATIENT)
Dept: URBAN - METROPOLITAN AREA CLINIC 63 | Facility: CLINIC | Age: 65
End: 2025-01-30

## 2025-02-17 ENCOUNTER — CLAIMS CREATED FROM THE CLAIM WINDOW (OUTPATIENT)
Dept: URBAN - METROPOLITAN AREA SURGERY CENTER 4 | Facility: SURGERY CENTER | Age: 65
End: 2025-02-17
Payer: MEDICARE

## 2025-02-17 ENCOUNTER — CLAIMS CREATED FROM THE CLAIM WINDOW (OUTPATIENT)
Dept: URBAN - METROPOLITAN AREA CLINIC 4 | Facility: CLINIC | Age: 65
End: 2025-02-17
Payer: MEDICARE

## 2025-02-17 DIAGNOSIS — K64.1 SECOND DEGREE HEMORRHOIDS: ICD-10-CM

## 2025-02-17 DIAGNOSIS — K63.89 OTHER SPECIFIED DISEASES OF INTESTINE: ICD-10-CM

## 2025-02-17 DIAGNOSIS — K57.30 DIVERTICULOSIS OF LARGE INTESTINE WITHOUT PERFORATION OR ABSCESS WITHOUT BLEEDING: ICD-10-CM

## 2025-02-17 DIAGNOSIS — R19.4 CHANGE IN BOWEL HABITS: ICD-10-CM

## 2025-02-17 DIAGNOSIS — K52.89 OTHER SPECIFIED NONINFECTIVE GASTROENTERITIS AND COLITIS: ICD-10-CM

## 2025-02-17 DIAGNOSIS — K57.30 DIVERTICULOSIS OF COLON: ICD-10-CM

## 2025-02-17 PROCEDURE — 88305 TISSUE EXAM BY PATHOLOGIST: CPT | Performed by: PATHOLOGY

## 2025-02-17 PROCEDURE — 88313 SPECIAL STAINS GROUP 2: CPT | Performed by: PATHOLOGY

## 2025-02-17 PROCEDURE — 88342 IMHCHEM/IMCYTCHM 1ST ANTB: CPT | Performed by: PATHOLOGY

## 2025-02-17 PROCEDURE — 45380 COLONOSCOPY AND BIOPSY: CPT | Performed by: INTERNAL MEDICINE

## 2025-02-17 PROCEDURE — 00811 ANES LWR INTST NDSC NOS: CPT | Performed by: NURSE ANESTHETIST, CERTIFIED REGISTERED

## 2025-02-17 RX ORDER — CLONAZEPAM 0.5 MG/1
1 TABLET TABLET ORAL ONCE A DAY
Status: ACTIVE | COMMUNITY

## 2025-02-17 RX ORDER — TAMSULOSIN HYDROCHLORIDE 0.4 MG/1
1 CAPSULE CAPSULE ORAL ONCE A DAY
Status: ACTIVE | COMMUNITY

## 2025-02-17 RX ORDER — TADALAFIL 5 MG/1
1 TABLET AS NEEDED TABLET, COATED ORAL ONCE A DAY
Status: ACTIVE | COMMUNITY

## 2025-02-17 RX ORDER — HYDROCHLOROTHIAZIDE 12.5 MG/1
1 CAPSULE IN THE MORNING CAPSULE, GELATIN COATED ORAL ONCE A DAY
Status: ACTIVE | COMMUNITY

## 2025-02-17 RX ORDER — EVOLOCUMAB 140 MG/ML
1 ML INJECTION, SOLUTION SUBCUTANEOUS
Status: ACTIVE | COMMUNITY

## 2025-02-17 RX ORDER — ACETAMINOPHEN 650 MG
2 TABLETS AS NEEDED TABLET, EXTENDED RELEASE ORAL
Status: ACTIVE | COMMUNITY

## 2025-02-17 RX ORDER — CLOPIDOGREL 75 MG/1
1 TABLET TABLET, FILM COATED ORAL ONCE A DAY
Status: ACTIVE | COMMUNITY

## 2025-02-17 RX ORDER — NAPROXEN 500 MG/1
1 TABLET WITH FOOD OR MILK AS NEEDED TABLET ORAL TWICE A DAY
Status: ACTIVE | COMMUNITY

## 2025-02-27 ENCOUNTER — LAB OUTSIDE AN ENCOUNTER (OUTPATIENT)
Dept: URBAN - METROPOLITAN AREA CLINIC 63 | Facility: CLINIC | Age: 65
End: 2025-02-27

## 2025-02-27 ENCOUNTER — OFFICE VISIT (OUTPATIENT)
Dept: URBAN - METROPOLITAN AREA CLINIC 63 | Facility: CLINIC | Age: 65
End: 2025-02-27
Payer: MEDICARE

## 2025-02-27 VITALS
DIASTOLIC BLOOD PRESSURE: 77 MMHG | SYSTOLIC BLOOD PRESSURE: 153 MMHG | TEMPERATURE: 97 F | HEIGHT: 69 IN | OXYGEN SATURATION: 98 % | WEIGHT: 200.8 LBS | BODY MASS INDEX: 29.74 KG/M2 | HEART RATE: 66 BPM

## 2025-02-27 DIAGNOSIS — K52.9 ACUTE COLITIS: ICD-10-CM

## 2025-02-27 DIAGNOSIS — K21.9 GASTROESOPHAGEAL REFLUX DISEASE WITHOUT ESOPHAGITIS: ICD-10-CM

## 2025-02-27 DIAGNOSIS — K86.2 PANCREATIC CYST: ICD-10-CM

## 2025-02-27 DIAGNOSIS — K76.0 FATTY LIVER: ICD-10-CM

## 2025-02-27 PROCEDURE — 99214 OFFICE O/P EST MOD 30 MIN: CPT

## 2025-02-27 NOTE — HPI-PREVIOUS LABS
Labs 10/30/2024 Stool studies negative Calprotectin [id number]  Labs dated 2/19/2024 showed a normal AFP. Normal CMP. Normal CBC. PT/INR normal  Labs dated 1/6/24 showed normal CMP. Normal CBC. Previous pancreatic elastase normal  Labs dated 11/9/2022 demonstrated normal CEA. AFP normal. CA 19-9 normal. CRP normal. CMP normal. CBC normal. TSH normal. HCV antibody negative.
2 weeks

## 2025-02-27 NOTE — HPI-TODAY'S VISIT:
Patient comes in for follow-up colonoscopy.  We reviewed the results of today's visit which showed congested mucosa throughout the colon with biopsies showing focal active colitis.  Noted to be nonspecific and consistent with resolving infectious diarrheal illness, bowel prep or NSAID use.  Patient has been feeling much better the past 6 weeks.  He states even before that his symptoms were starting to resolve but he has been completely resolved since early January.  Bowel movements are regular without pain or bleeding.  Denies fever, chills, nausea, vomiting, abdominal pain, unintentional weight loss.  Stool studies at the time were negative but calprotectin was elevated.  Last OV 10/29/2024: Patient coming in for an updated appointment for a new issue.  He reports since 10/10/2024 he has had change in bowel habits with acute diarrhea symptoms.  He said it started suddenly on that day with up to 10 [place] type VII stools without pain or signs of GI bleeding.  Associated symptoms of urgency.  He additionally mentions he was found to have a TIA on ER visit 10/11/2024.  He still has some residual dizziness following the event.  He has been doing Imodium as needed but still having 3-4 [place] type VI-VII stools per day without pain or bleeding.  He is nervous to eat and has been on a bland diet but states he is lost around 5 pounds due to not eating as much.  He states since our last visit he was put on antibiotics for kidney stones as well as a dental procedure so he was unsure if that played in affecting his symptoms.  He tried taking a probiotic for 1 week but did not notice much of a difference.  Last OV 8/16/2024: Jeol Gentile) is a pleasant 64-year-old male who presents today for a follow up.   Recent MRCP w/wo contract reviewed today with patient. We will repeat in 6 months. History of fatty liver. We will repeat Fibroscan 3/2025.  Noting regular bowel movements. Patient is feeling great. We review imaging and I answer all questions. Denies nausea, vomiting, heartburn, reflux, dysphagia, odynophagia, hematemesis, coffee ground emesis, abdominal pain, change in bowel habits, abnormal weight loss, BRBPR or melena. Denies family history of colon cancer or colon polyps. No other GI complaints at this time

## 2025-02-27 NOTE — HPI-PREVIOUS PROCEDURES
Colonoscopy 2/17/2025 by Dr. Logan noted normal ileum, congested mucosa in the cecum, ascending colon, distal descending colon, proximal descending colon sigmoid and rectosigmoid, diverticulosis in the sigmoid colon, external and internal hemorrhoids.  All biopsies showed focal active colitis.  Colonoscopy performed in March 2022 demonstrated moderate sigmoid diverticulosis. Repeat colonoscopy in 10 years  EGD dated 11/18/2022 showed a regular Z-line. Gastritis. Normal duodenum. Multiple gastric polyps in the gastric body biopsied. No significant duodenal abnormality. Chemical/reactive gastropathy negative for H. pylori or intestinal metaplasia. Body polyp consistent with a hyperplastic polyp. GE junction mucosa benign.

## 2025-03-28 ENCOUNTER — WEB ENCOUNTER (OUTPATIENT)
Dept: URBAN - METROPOLITAN AREA CLINIC 63 | Facility: CLINIC | Age: 65
End: 2025-03-28